# Patient Record
Sex: FEMALE | Race: WHITE | URBAN - METROPOLITAN AREA
[De-identification: names, ages, dates, MRNs, and addresses within clinical notes are randomized per-mention and may not be internally consistent; named-entity substitution may affect disease eponyms.]

---

## 2017-08-17 ENCOUNTER — IMPORTED ENCOUNTER (OUTPATIENT)
Dept: URBAN - METROPOLITAN AREA CLINIC 50 | Facility: CLINIC | Age: 77
End: 2017-08-17

## 2017-08-28 ENCOUNTER — IMPORTED ENCOUNTER (OUTPATIENT)
Dept: URBAN - METROPOLITAN AREA CLINIC 50 | Facility: CLINIC | Age: 77
End: 2017-08-28

## 2017-12-01 ENCOUNTER — IMPORTED ENCOUNTER (OUTPATIENT)
Dept: URBAN - METROPOLITAN AREA CLINIC 50 | Facility: CLINIC | Age: 77
End: 2017-12-01

## 2019-08-01 ENCOUNTER — IMPORTED ENCOUNTER (OUTPATIENT)
Dept: URBAN - METROPOLITAN AREA CLINIC 50 | Facility: CLINIC | Age: 79
End: 2019-08-01

## 2021-02-16 ENCOUNTER — IMPORTED ENCOUNTER (OUTPATIENT)
Dept: URBAN - METROPOLITAN AREA CLINIC 50 | Facility: CLINIC | Age: 81
End: 2021-02-16

## 2021-04-18 ASSESSMENT — TONOMETRY
OD_IOP_MMHG: 12
OD_IOP_MMHG: 13
OD_IOP_MMHG: 12
OS_IOP_MMHG: 12
OS_IOP_MMHG: 13
OS_IOP_MMHG: 13

## 2021-04-18 ASSESSMENT — VISUAL ACUITY
OD_CC: J1+
OS_CC: J1+
OD_CC: J1+
OS_CC: J1+
OS_SC: 20/40
OS_SC: 20/30-
OD_SC: 20/25
OD_SC: 20/30
OS_PH: 20/25
OD_SC: 20/30+
OS_SC: 20/40

## 2021-07-07 ENCOUNTER — PREPPED CHART (OUTPATIENT)
Dept: URBAN - METROPOLITAN AREA CLINIC 50 | Facility: CLINIC | Age: 81
End: 2021-07-07

## 2021-07-07 NOTE — PATIENT DISCUSSION
"""Horizontal double VA only when looking peripherally OS.  Small angle sixth nerve palsy noted "" You can access the [facility] Portal, offered by [facility], by registering with the following website: [email or web address]

## 2021-07-12 ENCOUNTER — 4 MONTH FOLLOW-UP (OUTPATIENT)
Dept: URBAN - METROPOLITAN AREA CLINIC 50 | Facility: CLINIC | Age: 81
End: 2021-07-12

## 2021-07-12 DIAGNOSIS — H16.223: ICD-10-CM

## 2021-07-12 DIAGNOSIS — H18.593: ICD-10-CM

## 2021-07-12 DIAGNOSIS — H49.22: ICD-10-CM

## 2021-07-12 DIAGNOSIS — H53.2: ICD-10-CM

## 2021-07-12 PROCEDURE — 92012 INTRM OPH EXAM EST PATIENT: CPT

## 2021-07-12 ASSESSMENT — VISUAL ACUITY
OU_CC: J1+
OS_CC: 20/25
OD_CC: 20/25-

## 2021-07-12 ASSESSMENT — TONOMETRY
OD_IOP_MMHG: 12
OS_IOP_MMHG: 12

## 2021-07-12 NOTE — PATIENT DISCUSSION
History of horizontal double VA only when looking peripherally OS secondary to small angle sixth nerve palsy. Double has resolved [id number] per patient. Motility intact on todays exam. Will monitor.